# Patient Record
Sex: FEMALE | URBAN - METROPOLITAN AREA
[De-identification: names, ages, dates, MRNs, and addresses within clinical notes are randomized per-mention and may not be internally consistent; named-entity substitution may affect disease eponyms.]

---

## 2020-12-08 ENCOUNTER — OFFICE VISIT (OUTPATIENT)
Dept: URBAN - METROPOLITAN AREA CLINIC 48 | Facility: CLINIC | Age: 65
End: 2020-12-08
Payer: MEDICARE

## 2020-12-08 DIAGNOSIS — H40.2230 CHRONIC ANGLE-CLOSURE GLAUCOMA, BILATERAL, STAGE UNSPECIFIED: Primary | ICD-10-CM

## 2020-12-08 PROCEDURE — 92004 COMPRE OPH EXAM NEW PT 1/>: CPT | Performed by: OPHTHALMOLOGY

## 2020-12-08 PROCEDURE — 92020 GONIOSCOPY: CPT | Performed by: OPHTHALMOLOGY

## 2020-12-08 PROCEDURE — 76514 ECHO EXAM OF EYE THICKNESS: CPT | Performed by: OPHTHALMOLOGY

## 2020-12-08 RX ORDER — PREDNISOLONE ACETATE 10 MG/ML
1 % SUSPENSION/ DROPS OPHTHALMIC
Qty: 5 | Refills: 2 | Status: ACTIVE
Start: 2020-12-08

## 2020-12-08 RX ORDER — LATANOPROST 50 UG/ML
0.005 % SOLUTION OPHTHALMIC
Qty: 2.5 | Refills: 5 | Status: ACTIVE
Start: 2020-12-08

## 2020-12-08 ASSESSMENT — VISUAL ACUITY
OS: 20/20
OD: 20/25

## 2020-12-08 ASSESSMENT — INTRAOCULAR PRESSURE
OD: 30
OD: 17
OS: 27
OS: 21

## 2020-12-08 ASSESSMENT — KERATOMETRY
OS: 42.75
OD: 43.25

## 2020-12-08 NOTE — IMPRESSION/PLAN
Impression: Chronic angle-closure glaucoma, bilateral, stage unspecified: H40.2230. Plan: Bilateral appositional angle closure with elevated IOP. Start latanoprost qhs OU. Recommend LPI OU, one eye at a time. May need more than 1 session per eye. Will dilate pupil post LPI. Discussed and reviewed diagnosis with patient today, understood by patient, occludable angles, recommend Laser Peripheral Iridotomy. Risks benefits, alternatives and expectations of the procedure explained. Patient expressed understanding. Avoid over the counter medication decongestants/allergy medications. To seek urgent eye care in case of pain/redness of an eye, blurred vision, headaches, halos around the lights or other symptoms of acute angle closure. Patient to start Prednisolone QID starting one day after laser. All potential side effects and benefits of Prednisolone discussed. Schedule LPI in both eyes; right eye then left eye.  RL 2

## 2022-04-21 ENCOUNTER — OFFICE VISIT (OUTPATIENT)
Dept: URBAN - METROPOLITAN AREA CLINIC 48 | Facility: CLINIC | Age: 67
End: 2022-04-21
Payer: MEDICARE

## 2022-04-21 DIAGNOSIS — H40.2230 CHRONIC ANGLE-CLOSURE GLAUCOMA, BILATERAL, STAGE UNSPECIFIED: Primary | ICD-10-CM

## 2022-04-21 DIAGNOSIS — H35.341 MACULAR CYST, HOLE, OR PSEUDOHOLE, RIGHT EYE: ICD-10-CM

## 2022-04-21 PROCEDURE — 92083 EXTENDED VISUAL FIELD XM: CPT | Performed by: OPHTHALMOLOGY

## 2022-04-21 PROCEDURE — 92020 GONIOSCOPY: CPT | Performed by: OPHTHALMOLOGY

## 2022-04-21 PROCEDURE — 92134 CPTRZ OPH DX IMG PST SGM RTA: CPT | Performed by: OPHTHALMOLOGY

## 2022-04-21 PROCEDURE — 99214 OFFICE O/P EST MOD 30 MIN: CPT | Performed by: OPHTHALMOLOGY

## 2022-04-21 RX ORDER — PREDNISOLONE ACETATE 10 MG/ML
1 % SUSPENSION/ DROPS OPHTHALMIC
Qty: 5 | Refills: 1 | Status: ACTIVE
Start: 2022-04-21

## 2022-04-21 RX ORDER — LATANOPROST 50 UG/ML
0.005 % SOLUTION OPHTHALMIC
Qty: 2.5 | Refills: 4 | Status: ACTIVE
Start: 2022-04-21

## 2022-04-21 ASSESSMENT — INTRAOCULAR PRESSURE
OD: 29
OS: 23

## 2022-04-21 NOTE — IMPRESSION/PLAN
Impression: Macular cyst, hole, or pseudohole, right eye: H35.341.  Plan: Recommend evaluation w/ Dr. Alisha Helms for [facility] once LPI are done

## 2022-04-21 NOTE — IMPRESSION/PLAN
Impression: Chronic angle-closure glaucoma, bilateral, stage unspecified: H40.2230. Plan: Needs LPI OU, closed angles OU. Once LPI is done, can see Dr. Vargas Sheridna for Macular hole evaluation and glaucoma screening
angle scan - narrow angles OU, possible plateau iris OU Start: Latanoprost QHS OU Risks benefits, alternatives and expectations of the procedure explained. Patient expressed understanding. Avoid over the counter medication decongestants/allergy medications. To seek urgent eye care in case of pain/redness of an eye, blurred vision, headaches, halos around the lights or other symptoms of acute angle closure. Patient to start Prednisolone QID starting one day after laser. All potential side effects and benefits of Prednisolone discussed. Schedule LPI in both eyes; right eye then left eye.  RL 2

## 2022-05-11 ENCOUNTER — SURGERY (OUTPATIENT)
Dept: URBAN - METROPOLITAN AREA SURGERY 26 | Facility: SURGERY | Age: 67
End: 2022-05-11
Payer: MEDICARE

## 2022-05-11 PROCEDURE — 66761 REVISION OF IRIS: CPT | Performed by: OPHTHALMOLOGY

## 2022-05-12 ENCOUNTER — POST-OPERATIVE VISIT (OUTPATIENT)
Dept: URBAN - METROPOLITAN AREA CLINIC 48 | Facility: CLINIC | Age: 67
End: 2022-05-12
Payer: MEDICARE

## 2022-05-12 PROCEDURE — 99024 POSTOP FOLLOW-UP VISIT: CPT | Performed by: OPHTHALMOLOGY

## 2022-05-12 ASSESSMENT — INTRAOCULAR PRESSURE: OD: 23

## 2022-05-12 NOTE — IMPRESSION/PLAN
Impression: S/P LPI (Laser Peripheral Iridotomy) OD - 1 Day. Encounter for surgical aftercare following surgery on a sense organ  Z48.810. Continue latanoprost qhs OU, predforte 4 times OD Plan: keep next appt.

## 2022-05-18 ENCOUNTER — SURGERY (OUTPATIENT)
Dept: URBAN - METROPOLITAN AREA SURGERY 26 | Facility: SURGERY | Age: 67
End: 2022-05-18
Payer: MEDICARE

## 2022-05-18 PROCEDURE — 66761 REVISION OF IRIS: CPT | Performed by: OPHTHALMOLOGY

## 2022-05-23 ENCOUNTER — POST-OPERATIVE VISIT (OUTPATIENT)
Dept: URBAN - METROPOLITAN AREA CLINIC 48 | Facility: CLINIC | Age: 67
End: 2022-05-23
Payer: MEDICARE

## 2022-05-23 DIAGNOSIS — Z48.810 ENCOUNTER FOR SURGICAL AFTERCARE FOLLOWING SURGERY ON A SENSE ORGAN: Primary | ICD-10-CM

## 2022-05-23 PROCEDURE — 99024 POSTOP FOLLOW-UP VISIT: CPT | Performed by: STUDENT IN AN ORGANIZED HEALTH CARE EDUCATION/TRAINING PROGRAM

## 2022-05-23 ASSESSMENT — INTRAOCULAR PRESSURE
OS: 15
OD: 20

## 2022-05-23 NOTE — IMPRESSION/PLAN
Impression: S/P LPI (Laser Peripheral Iridotomy) OS - 5 Days. Encounter for surgical aftercare following surgery on a sense organ  Z48.810. Plan: As preventative given symptoms of inflammation but not noted on today's exam. 

OU Continue Latanoprost QHS Start PF QID 

- Patient knows to call should symptoms not improve after starting steroid QID OD.

## 2022-06-01 ENCOUNTER — SURGERY (OUTPATIENT)
Dept: URBAN - METROPOLITAN AREA SURGERY 26 | Facility: SURGERY | Age: 67
End: 2022-06-01
Payer: MEDICARE

## 2022-06-01 PROCEDURE — 66761 REVISION OF IRIS: CPT | Performed by: OPHTHALMOLOGY

## 2022-06-08 ENCOUNTER — POST-OPERATIVE VISIT (OUTPATIENT)
Dept: URBAN - METROPOLITAN AREA CLINIC 48 | Facility: CLINIC | Age: 67
End: 2022-06-08
Payer: MEDICARE

## 2022-06-08 DIAGNOSIS — Z48.810 ENCOUNTER FOR SURGICAL AFTERCARE FOLLOWING SURGERY ON A SENSE ORGAN: Primary | ICD-10-CM

## 2022-06-08 PROCEDURE — 99024 POSTOP FOLLOW-UP VISIT: CPT | Performed by: OPHTHALMOLOGY

## 2022-06-08 ASSESSMENT — INTRAOCULAR PRESSURE
OD: 25
OS: 19
OD: 19
OS: 29

## 2022-06-08 NOTE — IMPRESSION/PLAN
Impression: S/P LPI (Laser Peripheral Iridotomy) OS - 7 Days. Encounter for surgical aftercare following surgery on a sense organ  Z48.810. Plan: IOP elevated with Telma People, better with Tonopen - possibly elevated due to abdominal compression PI patent OU Continue: Latanoprost QHS OU
d/c Pred

## 2022-10-06 ENCOUNTER — OFFICE VISIT (OUTPATIENT)
Dept: URBAN - METROPOLITAN AREA CLINIC 48 | Facility: CLINIC | Age: 67
End: 2022-10-06
Payer: MEDICARE

## 2022-10-06 DIAGNOSIS — H40.2230 CHRONIC ANGLE-CLOSURE GLAUCOMA, BILATERAL, STAGE UNSPECIFIED: Primary | ICD-10-CM

## 2022-10-06 PROCEDURE — 99213 OFFICE O/P EST LOW 20 MIN: CPT | Performed by: OPHTHALMOLOGY

## 2022-10-06 ASSESSMENT — INTRAOCULAR PRESSURE
OS: 21
OD: 20
OS: 25
OD: 26

## 2022-10-06 NOTE — IMPRESSION/PLAN
Impression: Chronic angle-closure glaucoma, bilateral, stage unspecified: H40.2230. Plan: IOP in acceptable rang ou, will continue to monitor on curren therapy.  
Start: Latanoprost QHS OU

## 2023-02-15 ENCOUNTER — OFFICE VISIT (OUTPATIENT)
Dept: URBAN - METROPOLITAN AREA CLINIC 48 | Facility: CLINIC | Age: 68
End: 2023-02-15
Payer: MEDICARE

## 2023-02-15 DIAGNOSIS — H40.2230 CHRONIC ANGLE-CLOSURE GLAUCOMA, BILATERAL, STAGE UNSPECIFIED: Primary | ICD-10-CM

## 2023-02-15 PROCEDURE — 99213 OFFICE O/P EST LOW 20 MIN: CPT | Performed by: OPHTHALMOLOGY

## 2023-02-15 RX ORDER — BRIMONIDINE TARTRATE 1 MG/ML
0.1 % SOLUTION/ DROPS OPHTHALMIC
Qty: 5 | Refills: 6 | Status: ACTIVE
Start: 2023-02-15

## 2023-02-15 ASSESSMENT — INTRAOCULAR PRESSURE
OS: 20
OD: 28

## 2023-02-15 NOTE — IMPRESSION/PLAN
Impression: Chronic angle-closure glaucoma, bilateral, stage unspecified: H40.2230. Plan: Discussed and reviewed diagnosis with patient today, patient to speak to family members regarding glaucoma,  understood by patient, intraocular pressure above target IOP, patient to start medications to lower ocular pressure, all potential side effects and benefits of Alphagan discussed. Patient made aware that failure to use this medication may result in Glaucoma progression.  


start: Alphagan BID OD
continue Latanoprost QHS OU

## 2023-09-15 ENCOUNTER — OFFICE VISIT (OUTPATIENT)
Dept: URBAN - METROPOLITAN AREA CLINIC 48 | Facility: CLINIC | Age: 68
End: 2023-09-15
Payer: MEDICARE

## 2023-09-15 DIAGNOSIS — H40.2230 CHRONIC ANGLE-CLOSURE GLAUCOMA, BILATERAL, STAGE UNSPECIFIED: Primary | ICD-10-CM

## 2023-09-15 PROCEDURE — 99213 OFFICE O/P EST LOW 20 MIN: CPT | Performed by: OPHTHALMOLOGY

## 2023-09-15 RX ORDER — BRIMONIDINE TARTRATE, TIMOLOL MALEATE 2; 5 MG/ML; MG/ML
SOLUTION/ DROPS OPHTHALMIC
Qty: 5 | Refills: 5 | Status: ACTIVE
Start: 2023-09-15

## 2023-09-15 ASSESSMENT — INTRAOCULAR PRESSURE
OD: 30
OS: 22

## 2023-10-11 ENCOUNTER — OFFICE VISIT (OUTPATIENT)
Dept: URBAN - METROPOLITAN AREA CLINIC 48 | Facility: CLINIC | Age: 68
End: 2023-10-11
Payer: MEDICARE

## 2023-10-11 DIAGNOSIS — H40.2231 CHRONIC ANGLE-CLOSURE GLAUCOMA, BILATERAL, MILD STAGE: Primary | ICD-10-CM

## 2023-10-11 PROCEDURE — 99213 OFFICE O/P EST LOW 20 MIN: CPT | Performed by: OPHTHALMOLOGY

## 2023-10-11 ASSESSMENT — INTRAOCULAR PRESSURE
OD: 19
OS: 16

## 2024-04-04 ENCOUNTER — OFFICE VISIT (OUTPATIENT)
Dept: URBAN - METROPOLITAN AREA CLINIC 48 | Facility: CLINIC | Age: 69
End: 2024-04-04
Payer: MEDICARE

## 2024-04-04 DIAGNOSIS — H40.2231 CHRONIC ANGLE-CLOSURE GLAUCOMA, BILATERAL, MILD STAGE: Primary | ICD-10-CM

## 2024-04-04 PROCEDURE — 99213 OFFICE O/P EST LOW 20 MIN: CPT | Performed by: OPHTHALMOLOGY

## 2024-04-04 RX ORDER — DORZOLAMIDE HYDROCHLORIDE AND TIMOLOL MALEATE 20; 5 MG/ML; MG/ML
SOLUTION/ DROPS OPHTHALMIC
Qty: 5 | Refills: 5 | Status: ACTIVE
Start: 2024-04-04

## 2024-04-04 ASSESSMENT — INTRAOCULAR PRESSURE
OD: 19
OS: 17

## 2024-07-09 ENCOUNTER — OFFICE VISIT (OUTPATIENT)
Dept: URBAN - METROPOLITAN AREA CLINIC 48 | Facility: CLINIC | Age: 69
End: 2024-07-09
Payer: MEDICARE

## 2024-07-09 DIAGNOSIS — H40.2231 CHRONIC ANGLE-CLOSURE GLAUCOMA, BILATERAL, MILD STAGE: Primary | ICD-10-CM

## 2024-07-09 PROCEDURE — 92133 CPTRZD OPH DX IMG PST SGM ON: CPT | Performed by: OPHTHALMOLOGY

## 2024-07-09 PROCEDURE — 99214 OFFICE O/P EST MOD 30 MIN: CPT | Performed by: OPHTHALMOLOGY

## 2024-07-09 PROCEDURE — 92083 EXTENDED VISUAL FIELD XM: CPT | Performed by: OPHTHALMOLOGY

## 2024-07-09 RX ORDER — LATANOPROST 50 UG/ML
0.005 % SOLUTION OPHTHALMIC
Qty: 2.5 | Refills: 3 | Status: ACTIVE
Start: 2024-07-09

## 2024-07-09 RX ORDER — DORZOLAMIDE HYDROCHLORIDE AND TIMOLOL MALEATE 20; 5 MG/ML; MG/ML
SOLUTION/ DROPS OPHTHALMIC
Qty: 5 | Refills: 5 | Status: ACTIVE
Start: 2024-07-09

## 2024-07-09 ASSESSMENT — INTRAOCULAR PRESSURE
OD: 21
OS: 21

## 2025-01-14 ENCOUNTER — OFFICE VISIT (OUTPATIENT)
Dept: URBAN - METROPOLITAN AREA CLINIC 48 | Facility: CLINIC | Age: 70
End: 2025-01-14
Payer: MEDICARE

## 2025-01-14 DIAGNOSIS — H40.2231 CHRONIC ANGLE-CLOSURE GLAUCOMA, BILATERAL, MILD STAGE: Primary | ICD-10-CM

## 2025-01-14 PROCEDURE — 99213 OFFICE O/P EST LOW 20 MIN: CPT | Performed by: OPHTHALMOLOGY

## 2025-01-14 ASSESSMENT — INTRAOCULAR PRESSURE
OD: 27
OS: 23

## 2025-02-25 ENCOUNTER — OFFICE VISIT (OUTPATIENT)
Dept: URBAN - METROPOLITAN AREA CLINIC 48 | Facility: CLINIC | Age: 70
End: 2025-02-25
Payer: MEDICARE

## 2025-02-25 DIAGNOSIS — H40.2231 CHRONIC ANGLE-CLOSURE GLAUCOMA, BILATERAL, MILD STAGE: Primary | ICD-10-CM

## 2025-02-25 PROCEDURE — 99213 OFFICE O/P EST LOW 20 MIN: CPT | Performed by: OPHTHALMOLOGY

## 2025-02-25 ASSESSMENT — INTRAOCULAR PRESSURE
OD: 21
OS: 19

## 2025-06-24 ENCOUNTER — OFFICE VISIT (OUTPATIENT)
Dept: URBAN - METROPOLITAN AREA CLINIC 48 | Facility: CLINIC | Age: 70
End: 2025-06-24
Payer: MEDICARE

## 2025-06-24 DIAGNOSIS — H40.2231 CHRONIC ANGLE-CLOSURE GLAUCOMA, BILATERAL, MILD STAGE: Primary | ICD-10-CM

## 2025-06-24 PROCEDURE — 99213 OFFICE O/P EST LOW 20 MIN: CPT | Performed by: OPHTHALMOLOGY

## 2025-06-24 RX ORDER — DORZOLAMIDE HYDROCHLORIDE AND TIMOLOL MALEATE 20; 5 MG/ML; MG/ML
SOLUTION/ DROPS OPHTHALMIC
Qty: 5 | Refills: 5 | Status: ACTIVE
Start: 2025-06-24

## 2025-06-24 RX ORDER — LATANOPROST 50 UG/ML
0.005 % SOLUTION OPHTHALMIC
Qty: 2.5 | Refills: 0 | Status: ACTIVE
Start: 2025-06-24

## 2025-06-24 ASSESSMENT — INTRAOCULAR PRESSURE
OS: 20
OD: 21